# Patient Record
Sex: MALE | Race: BLACK OR AFRICAN AMERICAN | Employment: FULL TIME | URBAN - METROPOLITAN AREA
[De-identification: names, ages, dates, MRNs, and addresses within clinical notes are randomized per-mention and may not be internally consistent; named-entity substitution may affect disease eponyms.]

---

## 2020-07-15 ENCOUNTER — OFFICE VISIT (OUTPATIENT)
Dept: ORTHOPEDIC SURGERY | Age: 30
End: 2020-07-15
Payer: COMMERCIAL

## 2020-07-15 VITALS — WEIGHT: 180 LBS | TEMPERATURE: 97.9 F | BODY MASS INDEX: 22.38 KG/M2 | HEIGHT: 75 IN

## 2020-07-15 PROCEDURE — 99203 OFFICE O/P NEW LOW 30 MIN: CPT | Performed by: PHYSICIAN ASSISTANT

## 2020-07-15 RX ORDER — METHYLPREDNISOLONE 4 MG/1
TABLET ORAL
Qty: 1 KIT | Refills: 0 | Status: SHIPPED | OUTPATIENT
Start: 2020-07-15 | End: 2020-07-21

## 2020-07-15 SDOH — HEALTH STABILITY: MENTAL HEALTH: HOW OFTEN DO YOU HAVE A DRINK CONTAINING ALCOHOL?: NEVER

## 2020-07-15 NOTE — PROGRESS NOTES
flexion, extension and lateral bending are mildly reduced with pain. He has mild tenderness over his lumbar spine without obvious muscle spasm. Tenderness to left lumbar paraspinal muscles. The skin over his lumbar spine is normal without a surgical scar. Lower extremities:  He has 5/5 motor strength of bilateral lower extremities. He has a negative straight leg raise, bilaterally. Deep tendon reflexes at knees and achilles are 2+. Sensation is intact to light touch L3 to S1 bilaterally. He has no clonus. Hip range of motion painless. Imaging:  I reviewed AP and lateral xray images of his lumbar spine from office today. They note moderate dextroscoliosis without evidence of fracture or instability. Assessment:  Lumbar myofascial pain  Scoliosis    Plan:  We discussed treatment options including observation, oral steroids, physical therapy, and additional imaging. He would like to proceed with outpatient physical therapy along with oral steroids. He will return PRN.     Bradford Vasquez PA-C

## 2023-11-06 ENCOUNTER — OFFICE VISIT (OUTPATIENT)
Age: 33
End: 2023-11-06

## 2023-11-06 VITALS
BODY MASS INDEX: 22.49 KG/M2 | TEMPERATURE: 98 F | HEIGHT: 75 IN | OXYGEN SATURATION: 98 % | HEART RATE: 71 BPM | WEIGHT: 180.9 LBS | SYSTOLIC BLOOD PRESSURE: 138 MMHG | DIASTOLIC BLOOD PRESSURE: 83 MMHG

## 2023-11-06 DIAGNOSIS — J40 BRONCHITIS: Primary | ICD-10-CM

## 2023-11-06 RX ORDER — ALBUTEROL SULFATE 0.63 MG/3ML
1 SOLUTION RESPIRATORY (INHALATION) EVERY 6 HOURS PRN
COMMUNITY

## 2023-11-06 RX ORDER — CETIRIZINE HYDROCHLORIDE 10 MG/1
10 TABLET ORAL DAILY
Qty: 7 TABLET | Refills: 0 | Status: SHIPPED | OUTPATIENT
Start: 2023-11-06 | End: 2023-11-13

## 2023-11-06 RX ORDER — METHYLPREDNISOLONE 4 MG/1
TABLET ORAL
Qty: 1 KIT | Refills: 0 | Status: SHIPPED | OUTPATIENT
Start: 2023-11-06

## 2023-11-06 RX ORDER — ALBUTEROL SULFATE 90 UG/1
2 AEROSOL, METERED RESPIRATORY (INHALATION) EVERY 6 HOURS PRN
Qty: 18 G | Refills: 3 | Status: SHIPPED | OUTPATIENT
Start: 2023-11-06

## 2023-11-06 ASSESSMENT — ENCOUNTER SYMPTOMS
NAUSEA: 0
VOMITING: 0

## 2023-11-06 NOTE — PATIENT INSTRUCTIONS
- Steroids will cause your glucose (blood sugar) levels to rise. If you are diabetic, please monitor your glucose levels carefully. Watch your food intake and take caution with foods high in sugar and carbohydrates as weight gain is another side effect of steroid use. Elevated heart rate is another common finding with steroids. Take this medication with food as it can irritate your stomach to the same degree that ibuprofen would. Jefe,    It was an absolute pleasure taking care of you today. I'm hoping you'll start feeling better as soon as possible. Please call me if you have any questions or concerns about what we talked about today. Feel free stop back in if anything changes or things seem to be getting worse. If for some reason you develop any serious or potentially life-threatening issue, call 911 or proceed to the nearest emergency department. Hopefully it will never come to that. Otherwise, it was very nice to meet you Jefe.       Thanks,     Brianna Chinchilla

## 2024-02-16 ENCOUNTER — OFFICE VISIT (OUTPATIENT)
Age: 34
End: 2024-02-16

## 2024-02-16 VITALS
DIASTOLIC BLOOD PRESSURE: 98 MMHG | SYSTOLIC BLOOD PRESSURE: 148 MMHG | OXYGEN SATURATION: 99 % | TEMPERATURE: 98.9 F | BODY MASS INDEX: 24.37 KG/M2 | WEIGHT: 196 LBS | HEIGHT: 75 IN

## 2024-02-16 DIAGNOSIS — R03.0 ELEVATED BLOOD PRESSURE READING WITHOUT DIAGNOSIS OF HYPERTENSION: ICD-10-CM

## 2024-02-16 DIAGNOSIS — R50.9 FEVER, UNSPECIFIED FEVER CAUSE: ICD-10-CM

## 2024-02-16 DIAGNOSIS — J02.9 SORE THROAT: ICD-10-CM

## 2024-02-16 DIAGNOSIS — J06.9 UPPER RESPIRATORY TRACT INFECTION, UNSPECIFIED TYPE: Primary | ICD-10-CM

## 2024-02-16 DIAGNOSIS — R06.6 HICCUPS: ICD-10-CM

## 2024-02-16 LAB
INFLUENZA A ANTIBODY: NEGATIVE
INFLUENZA B ANTIBODY: NEGATIVE
Lab: NORMAL
QC PASS/FAIL: NORMAL
SARS-COV-2 RDRP RESP QL NAA+PROBE: NEGATIVE
STREPTOCOCCUS A RNA: NEGATIVE

## 2024-02-16 RX ORDER — AMOXICILLIN 500 MG/1
500 CAPSULE ORAL 3 TIMES DAILY
Qty: 30 CAPSULE | Refills: 0 | Status: SHIPPED | OUTPATIENT
Start: 2024-02-16 | End: 2024-02-26

## 2024-02-16 RX ORDER — CHLORPROMAZINE HYDROCHLORIDE 25 MG/1
25 TABLET, FILM COATED ORAL
Qty: 9 TABLET | Refills: 0 | Status: SHIPPED | OUTPATIENT
Start: 2024-02-16 | End: 2024-02-19

## 2024-02-16 NOTE — PATIENT INSTRUCTIONS
Keep hydrated, tylenol or ibuprofen (if no contraindications) as needed if pain or fever..  gargle-cool liquids. follow up in  7- days if not better  Return sooner or go to the ER if symptoms worse/feeling worse or has new symptoms or concerns   Schedule appointment with PCP to have BP re check..

## 2024-02-16 NOTE — PROGRESS NOTES
Jefe Velez (:  1990) is a 33 y.o. male,Established patient, here for evaluation of the following chief complaint(s):  Pharyngitis (Sore throat, body aches, fever, symptoms x 1 day/)      ASSESSMENT/PLAN:    ICD-10-CM    1. Upper respiratory tract infection, unspecified type  J06.9 amoxicillin (AMOXIL) 500 MG capsule      2. Sore throat  J02.9 POCT Influenza A/B     POCT Rapid Strep A DNA (Alere i)      3. Hiccups  R06.6 chlorproMAZINE (THORAZINE) 25 MG tablet      4. Fever, unspecified fever cause  R50.9 POCT COVID-19 Rapid, NAAT      5. Elevated blood pressure reading without diagnosis of hypertension  R03.0           Patient can not stop hiccupping  Will try to stop hiccupping..takes no other medications  Patient wants antibiotic because of productive cough despite discussion that symptoms could be viral based on exam and antibiotic would not be effective treating these symptoms.I have been unable to convince otherwise    Advised to return in 24-48 hours if still hiccupping     Keep hydrated, tylenol or ibuprofen (if no contraindications) as needed if pain or fever..  gargle-cool liquids. follow up in  7- days if not better  Return sooner or go to the ER if symptoms worse/feeling worse or has new symptoms or concerns   Schedule appointment with PCP to have BP re check..       SUBJECTIVE/OBJECTIVE:  Patient presents with:  Pharyngitis: Sore throat, body aches, fever,  productive cough,  no shortness of breath.. can not stop hiccupping since yesterday.... symptoms x 1 day           History provided by:  Patient   used: No        Vitals:    24 1451 24 1544   BP: (!) 167/113 (!) 148/98   Site:  Left Upper Arm   Position:  Supine   Cuff Size:  Medium Adult   Temp: 98.9 °F (37.2 °C)    TempSrc: Oral    SpO2: 99%    Weight: 88.9 kg (196 lb)    Height: 1.905 m (6' 3\")        Review of Systems   Constitutional:  Positive for fever.   HENT:  Positive for congestion and sore